# Patient Record
(demographics unavailable — no encounter records)

---

## 2024-10-22 NOTE — RISK ASSESSMENT
[Has a racial, or ethnic risk of G6PD deficiency (, , Mediterranean, or  ancestry)] : Has a racial, or ethnic risk of G6PD deficiency (, , Mediterranean, or  ancestry)  [Requires G6PD quantitative test] : Requires G6PD quantitative test [Presents with hemolytic anemia] : Does not present with hemolytic anemia  [Presents with hemolytic jaundice] : Does not present with hemolytic jaundice  [Presents with early onset increasing  jaundice persisting beyond the first week of life (bilirubin level greater than the 40th percentile] : Does not present with early onset increasing  jaundice persisting beyond the first week of life (bilirubin level greater than the 40th percentile for age in hours)   [Is admitted to the hospital for jaundice following discharge] : Is not admitted to the hospital for jaundice following discharge   [Has family history of G6PD deficiency (Symptoms include anemia and jaundice following illness, ingestion of bandar beans or bitter melon,] : Does not have family history of G6PD deficiency (Symptoms include anemia and jaundice following illness, ingestion of bandar beans or bitter melon, exposure to tash compounds or mothballs, or after taking certain medications (including but not limited to sulfa-containing drugs, primaquine, dapsone, fluoroquinolones, nitrofurantoin, pyridium, sulfonylureas, etc.)

## 2024-10-22 NOTE — DISCUSSION/SUMMARY
[Normal Growth] : growth [Normal Development] : developmental [No Elimination Concerns] : elimination [Continue Regimen] : feeding [No Skin Concerns] : skin [Normal Sleep Pattern] : sleep [Term Infant] : term infant [None] : no known medical problems [Add Food/Vitamin] : add ~M [Vitamin D] : vitamin D [Anticipatory Guidance Given] : Anticipatory guidance addressed as per the history of present illness section [No Vaccines] : no vaccines needed [No Medications] : ~He/She~ is not on any medications [Parent/Guardian] : Parent/Guardian [FreeTextEntry1] : Ex FT 4 Day old F here for NB Visit Declined Hep B and Beyfortus [place] score 0 Vitamin D sent RTC in one week for weight check  HM Recommend exclusive breastfeeding, 8-12 feedings per day. Mother should continue prenatal vitamins and avoid alcohol. If formula is needed, recommend iron-fortified formulations, 2-4 oz every 2-3 hrs. Wash hands before touching your baby. When in car, patient should be in rear-facing car seat in back seat. Put baby to sleep on back, in own crib with no loose or soft bedding. Bathe with a washcloth until cord falls off and if a boy until circumcision heals. Support the 's head and hold the baby close. Help baby to develop sleep and feeding routines. Limit baby's exposure to others, especially those with fever or unknown vaccine status. Parents counseled to call if rectal temperature >100.4 degrees F.

## 2024-10-22 NOTE — DISCUSSION/SUMMARY
[Normal Growth] : growth [Normal Development] : developmental [No Elimination Concerns] : elimination [Continue Regimen] : feeding [No Skin Concerns] : skin [Normal Sleep Pattern] : sleep [Term Infant] : term infant [None] : no known medical problems [Add Food/Vitamin] : add ~M [Vitamin D] : vitamin D [Anticipatory Guidance Given] : Anticipatory guidance addressed as per the history of present illness section [No Vaccines] : no vaccines needed [No Medications] : ~He/She~ is not on any medications [Parent/Guardian] : Parent/Guardian [FreeTextEntry1] : Ex FT 4 Day old F here for NB Visit Declined Hep B and Beyfortus [place] score 0 Vitamin D sent RTC in one week for weight check  HM Recommend exclusive breastfeeding, 8-12 feedings per day. Mother should continue prenatal vitamins and avoid alcohol. If formula is needed, recommend iron-fortified formulations, 2-4 oz every 2-3 hrs. Wash hands before touching your baby. When in car, patient should be in rear-facing car seat in back seat. Put baby to sleep on back, in own crib with no loose or soft bedding. Bathe with a washcloth until cord falls off and if a boy until circumcision heals. Support the 's head and hold the baby close. Help baby to develop sleep and feeding routines. Limit baby's exposure to others, especially those with fever or unknown vaccine status. Parents counseled to call if rectal temperature >100.4 degrees F.

## 2024-10-22 NOTE — PHYSICAL EXAM
[Alert] : alert [Normocephalic] : normocephalic [Flat Open Anterior [place]] : flat open anterior fontanelle [PERRL] : PERRL [Red Reflex Bilateral] : red reflex bilateral [Normally Placed Ears] : normally placed ears [Auricles Well Formed] : auricles well formed [Clear Tympanic membranes] : clear tympanic membranes [Light reflex present] : light reflex present [Bony structures visible] : bony structures visible [Patent Auditory Canal] : patent auditory canal [Nares Patent] : nares patent [Palate Intact] : palate intact [Uvula Midline] : uvula midline [Supple, full passive range of motion] : supple, full passive range of motion [Symmetric Chest Rise] : symmetric chest rise [Clear to Auscultation Bilaterally] : clear to auscultation bilaterally [Regular Rate and Rhythm] : regular rate and rhythm [S1, S2 present] : S1, S2 present [+2 Femoral Pulses] : +2 femoral pulses [Soft] : soft [Bowel Sounds] : bowel sounds present [Umbilical Stump Dry, Clean, Intact] : umbilical stump dry, clean, intact [Normal external genitalia] : normal external genitalia [Patent Vagina] : patent vagina [Patent] : patent [Normally Placed] : normally placed [No Abnormal Lymph Nodes Palpated] : no abnormal lymph nodes palpated [Symmetric Flexed Extremities] : symmetric flexed extremities [Startle Reflex] : startle reflex present [Suck Reflex] : suck reflex present [Rooting] : rooting reflex present [Palmar Grasp] : palmar grasp present [Plantar Grasp] : plantar reflex present [Symmetric Roya] : symmetric [place] [Acute Distress] : no acute distress [Icteric sclera] : nonicteric sclera [Discharge] : no discharge [Palpable Masses] : no palpable masses [Murmurs] : no murmurs [Tender] : nontender [Distended] : not distended [Hepatomegaly] : no hepatomegaly [Splenomegaly] : no splenomegaly [Clitoromegaly] : no clitoromegaly [Campuzano-Ortolani] : negative Campuzano-Ortolani [Spinal Dimple] : no spinal dimple [Tuft of Hair] : no tuft of hair [Jaundice] : not jaundice

## 2024-10-22 NOTE — HISTORY OF PRESENT ILLNESS
[TcB: _____] : Transcutaneous Bilirubin [unfilled] mg/dL [Born at ___ Wks Gestation] : The patient was born at [unfilled] weeks gestation [] : via normal spontaneous vaginal delivery [[facility]] : at [facility] [(1) _____] : [unfilled] [(5) _____] : [unfilled] [BW: _____] : weight of [unfilled] [Length: _____] : length of [unfilled] [HC: _____] : head circumference of [unfilled] [Time of Birth: _____] : Time of birth was [unfilled] [Age: ___] : [unfilled] year old mother [G: ___] : G [unfilled] [P: ___] : P [unfilled] [Rubella (Immune)] : Rubella immune [MBT: ____] : MBT - [unfilled] [None] : There are no risk factors [Formula ___ oz/feed] : [unfilled] oz of formula per feed [Frequency of stools: ___] : Frequency of stools: [unfilled]  stools [Green/brown] : green/brown [In Bassinet/Crib] : sleeps in bassinet/crib [On back] : sleeps on back [Water heater temperature set at <120 degrees F] : Water heater temperature set at <120 degrees F [Rear facing car seat in back seat] : Rear facing car seat in back seat [Carbon Monoxide Detectors] : Carbon monoxide detectors at home [Smoke Detectors] : Smoke detectors at home. [NO] : No [Hours between feeds ___] : Child is fed every [unfilled] hours [No] : No cigarette smoke exposure [HepBsAG] : HepBsAg negative [HIV] : HIV negative [HepC] : Hepatitis C negative [GBS] : GBS negative [VDRL/RPR (Reactive)] : VDRL/RPR nonreactive [de-identified] : 24 [FreeTextEntry8] : PediatricianCritical Congenital Heart Defect (CCHD) ScreenCCHD Screen [10-14]: InitialPre-Ductal SpO2(%): 99Post-Ductal SpO2(%): 377GjT4 Difference(Pre MINUS Post): -1Extremities Used: Right Hand, Right FootResult: PassedFollow up: Normal Screen- (No follow-up needed) ScreenScreen#: [id number] Date: 14-Oct-2024Screen Comment: N/AScreen#: [id number] Date: 14-Oct-2024Screen Comment: cchd passed. right hand 99%. right foot 100Hearing Screen - FinalRight ear hearing screen completed date: 14-Oct-2024Right ear screen method: EOAE (evoked otoacoustic emission)Right ear screen result: PassedRight ear screen comment: N/ALeft ear hearing screen completed date: 14-Oct-2024Left ear screen method: EOAE (evoked otoacoustic emission)Left ear screen result: PassedLeft ear screen comments: N/ATranscutaneous BilirubinSite: Sternum (14 Oct 710956:24)Bilirubin: 7.2 (14 Oct 893823:)Bilirubin: 5.9 (14 Oct 503322:43)Site: Sternum (14 Oct 189949:43) [Co-sleeping] : no co-sleeping [Loose bedding, pillow, toys, and/or bumpers in crib] : no loose bedding, pillow, toys, and/or bumpers in crib [Pacifier] : Not using pacifier [Exposure to electronic nicotine delivery system] : No exposure to electronic nicotine delivery system [Hepatitis B Vaccine Given] : Hepatitis B vaccine not given [de-identified] : none [FreeTextEntry1] : Discharge Information for your PediatricianHospital General InformationDate/Time of Birth: 10/13/2024 05:39:00Hospital Narrative: Baby is a 37.4wk AGA female born to a 28y/o  mother via . PEDS called to delivery for chorio and Mg. Maternal history uncomplicated. Pregnancy c/b sPEC (mg), chorio. Maternal blood type A+. PNL HIV negative, HepB negative, RPR non-reactive, and Rubella immune. GBS negative on 10/2. AROM at 0600 on 10/12 clear fluids. Terminal mec. Delivery uncomplicated. Delay cord clamp x1 min. Baby born vigorous and crying spontaneously. Warmed, dried, suctioned and stimulated. Apgars 9/9. EOS 0.47. Mom temp 38.1, got zosyn >4hr before. Mom plans to breastfeed and consents hepB.BW: 2745DOB: 10/13TOB: 0539Since admission to the NBN, baby has been feeding well, stooling and making wet diapers. Vitals have remained stable. Baby received routine NBN care. The baby lost an acceptable amount of weight during the nursery stay, down 1.82% from birth weight. Bilirubin was 7.2 at 24 hours of life.Parents have received routine  care education. The baby had all of the appropriate  screens before discharge and was noted to have normal feeding/voiding/stooling patterns at the time of discharge. The parents are aware to follow up with their outpatient pediatrician within 24-48 hrs and to closely monitor infant at home for any worrisome signs including, but not limited to, poor feeding, excess weight loss, dehydration, respiratory distress, fever, increasing jaundice or any other concern. Parents request this early discharge and agree to contact the baby's healthcare provider for any of the above.See below for CCHD, auditory screening, and Hepatitis B vaccine status.Hospital Diagnosis / GoalsDiagnosis: Single liveborn infant, delivered vaginally Assessment and Plan of Treatment: PImmunizationsVaccine Information: No Vaccines Administered.10/15/2024 2:30:19 PMRequested By: Fernanda Hayes ID: [id number] from: ABDELRAHMAN GRIDER [address] StreetOne Copy for the Patient  / One copy for the CHARTPage: 3 of 6 APURVA HANNAIFERDate of Birth: 10/13/2024MRN/VisitID: [id number] Psychiatric [facility] CtrCONFIDENTIALCONFIDENTIALPATIENT DISCHARGE INSTRUCTIONSDischarge Information for your Patient Portal InstructionsYou can access the [email or web address] Patient Portal offered by [employer] by registering at the following website: [email or web address]. By joining [employer]'s [email or web address] portal, you will also be able to view your health information using other applications (apps) compatible with our system

## 2024-10-22 NOTE — HISTORY OF PRESENT ILLNESS
[TcB: _____] : Transcutaneous Bilirubin [unfilled] mg/dL [Born at ___ Wks Gestation] : The patient was born at [unfilled] weeks gestation [] : via normal spontaneous vaginal delivery [[facility]] : at [facility] [(1) _____] : [unfilled] [(5) _____] : [unfilled] [BW: _____] : weight of [unfilled] [Length: _____] : length of [unfilled] [HC: _____] : head circumference of [unfilled] [Time of Birth: _____] : Time of birth was [unfilled] [Age: ___] : [unfilled] year old mother [G: ___] : G [unfilled] [P: ___] : P [unfilled] [Rubella (Immune)] : Rubella immune [MBT: ____] : MBT - [unfilled] [None] : There are no risk factors [Formula ___ oz/feed] : [unfilled] oz of formula per feed [Frequency of stools: ___] : Frequency of stools: [unfilled]  stools [Green/brown] : green/brown [In Bassinet/Crib] : sleeps in bassinet/crib [On back] : sleeps on back [Water heater temperature set at <120 degrees F] : Water heater temperature set at <120 degrees F [Rear facing car seat in back seat] : Rear facing car seat in back seat [Carbon Monoxide Detectors] : Carbon monoxide detectors at home [Smoke Detectors] : Smoke detectors at home. [NO] : No [Hours between feeds ___] : Child is fed every [unfilled] hours [No] : No cigarette smoke exposure [HepBsAG] : HepBsAg negative [HIV] : HIV negative [HepC] : Hepatitis C negative [GBS] : GBS negative [VDRL/RPR (Reactive)] : VDRL/RPR nonreactive [de-identified] : 24 [FreeTextEntry8] : PediatricianCritical Congenital Heart Defect (CCHD) ScreenCCHD Screen [10-14]: InitialPre-Ductal SpO2(%): 99Post-Ductal SpO2(%): 844WiF3 Difference(Pre MINUS Post): -1Extremities Used: Right Hand, Right FootResult: PassedFollow up: Normal Screen- (No follow-up needed) ScreenScreen#: [id number] Date: 14-Oct-2024Screen Comment: N/AScreen#: [id number] Date: 14-Oct-2024Screen Comment: cchd passed. right hand 99%. right foot 100Hearing Screen - FinalRight ear hearing screen completed date: 14-Oct-2024Right ear screen method: EOAE (evoked otoacoustic emission)Right ear screen result: PassedRight ear screen comment: N/ALeft ear hearing screen completed date: 14-Oct-2024Left ear screen method: EOAE (evoked otoacoustic emission)Left ear screen result: PassedLeft ear screen comments: N/ATranscutaneous BilirubinSite: Sternum (14 Oct 454491:24)Bilirubin: 7.2 (14 Oct 810985:)Bilirubin: 5.9 (14 Oct 631416:43)Site: Sternum (14 Oct 935359:43) [Co-sleeping] : no co-sleeping [Loose bedding, pillow, toys, and/or bumpers in crib] : no loose bedding, pillow, toys, and/or bumpers in crib [Pacifier] : Not using pacifier [Exposure to electronic nicotine delivery system] : No exposure to electronic nicotine delivery system [Hepatitis B Vaccine Given] : Hepatitis B vaccine not given [de-identified] : none [FreeTextEntry1] : Discharge Information for your PediatricianHospital General InformationDate/Time of Birth: 10/13/2024 05:39:00Hospital Narrative: Baby is a 37.4wk AGA female born to a 26y/o  mother via . PEDS called to delivery for chorio and Mg. Maternal history uncomplicated. Pregnancy c/b sPEC (mg), chorio. Maternal blood type A+. PNL HIV negative, HepB negative, RPR non-reactive, and Rubella immune. GBS negative on 10/2. AROM at 0600 on 10/12 clear fluids. Terminal mec. Delivery uncomplicated. Delay cord clamp x1 min. Baby born vigorous and crying spontaneously. Warmed, dried, suctioned and stimulated. Apgars 9/9. EOS 0.47. Mom temp 38.1, got zosyn >4hr before. Mom plans to breastfeed and consents hepB.BW: 2745DOB: 10/13TOB: 0539Since admission to the NBN, baby has been feeding well, stooling and making wet diapers. Vitals have remained stable. Baby received routine NBN care. The baby lost an acceptable amount of weight during the nursery stay, down 1.82% from birth weight. Bilirubin was 7.2 at 24 hours of life.Parents have received routine  care education. The baby had all of the appropriate  screens before discharge and was noted to have normal feeding/voiding/stooling patterns at the time of discharge. The parents are aware to follow up with their outpatient pediatrician within 24-48 hrs and to closely monitor infant at home for any worrisome signs including, but not limited to, poor feeding, excess weight loss, dehydration, respiratory distress, fever, increasing jaundice or any other concern. Parents request this early discharge and agree to contact the baby's healthcare provider for any of the above.See below for CCHD, auditory screening, and Hepatitis B vaccine status.Hospital Diagnosis / GoalsDiagnosis: Single liveborn infant, delivered vaginally Assessment and Plan of Treatment: PImmunizationsVaccine Information: No Vaccines Administered.10/15/2024 2:30:19 PMRequested By: Fernanda Hayes ID: [id number] from: ABDELRAHMAN GRIDER [address] StreetOne Copy for the Patient  / One copy for the CHARTPage: 3 of 6 APURVA HANNAIFERDate of Birth: 10/13/2024MRN/VisitID: [id number]. Lawrence Health System CtrCONFIDENTIALCONFIDENTIALPATIENT DISCHARGE INSTRUCTIONSDischarge Information for your Patient Portal InstructionsYou can access the [employer] Patient Portal offered by [employer] by registering at the following website: [email or web address]. By joining [employer]'s [employer] portal, you will also be able to view your health information using other applications (apps) compatible with our system

## 2024-10-22 NOTE — PHYSICAL EXAM
[Alert] : alert [Normocephalic] : normocephalic [Flat Open Anterior [place]] : flat open anterior fontanelle [PERRL] : PERRL [Red Reflex Bilateral] : red reflex bilateral [Normally Placed Ears] : normally placed ears [Auricles Well Formed] : auricles well formed [Clear Tympanic membranes] : clear tympanic membranes [Light reflex present] : light reflex present [Bony structures visible] : bony structures visible [Patent Auditory Canal] : patent auditory canal [Nares Patent] : nares patent [Palate Intact] : palate intact [Uvula Midline] : uvula midline [Supple, full passive range of motion] : supple, full passive range of motion [Symmetric Chest Rise] : symmetric chest rise [Clear to Auscultation Bilaterally] : clear to auscultation bilaterally [Regular Rate and Rhythm] : regular rate and rhythm [S1, S2 present] : S1, S2 present [+2 Femoral Pulses] : +2 femoral pulses [Soft] : soft [Bowel Sounds] : bowel sounds present [Umbilical Stump Dry, Clean, Intact] : umbilical stump dry, clean, intact [Normal external genitalia] : normal external genitalia [Patent Vagina] : patent vagina [Patent] : patent [Normally Placed] : normally placed [No Abnormal Lymph Nodes Palpated] : no abnormal lymph nodes palpated [Symmetric Flexed Extremities] : symmetric flexed extremities [Startle Reflex] : startle reflex present [Suck Reflex] : suck reflex present [Rooting] : rooting reflex present [Palmar Grasp] : palmar grasp present [Plantar Grasp] : plantar reflex present [Symmetric Roya] : symmetric [place] [Acute Distress] : no acute distress [Icteric sclera] : nonicteric sclera [Discharge] : no discharge [Palpable Masses] : no palpable masses [Murmurs] : no murmurs [Tender] : nontender [Distended] : not distended [Hepatomegaly] : no hepatomegaly [Splenomegaly] : no splenomegaly [Clitoromegaly] : no clitoromegaly [Campuzano-Ortolani] : negative Campuzano-Ortolani [Spinal Dimple] : no spinal dimple [Tuft of Hair] : no tuft of hair [Jaundice] : not jaundice

## 2024-10-24 NOTE — HISTORY OF PRESENT ILLNESS
[FreeTextEntry6] : weight check  regained BW no concerns feeding breast milk 2 ounces every 2-3 hours

## 2024-10-24 NOTE — DISCUSSION/SUMMARY
[FreeTextEntry1] : 11D old here for Wt check regained BW, gained 10g per day since last visit Educated to feed on demand RTC in 2 weeks/PRN for wt check and early 1 month visit  HM Recommend exclusive breastfeeding, 8-12 feedings per day. Mother should continue prenatal vitamins and avoid alcohol. If formula is needed, recommend iron-fortified formulations, 2-4 oz every 2-3 hrs. Wash hands before touching your baby. When in car, patient should be in rear-facing car seat in back seat. Put baby to sleep on back, in own crib with no loose or soft bedding. Bathe with a washcloth until cord falls off and if a boy until circumcision heals. Support the 's head and hold the baby close. Help baby to develop sleep and feeding routines. Limit baby's exposure to others, especially those with fever or unknown vaccine status. Parents counseled to call if rectal temperature >100.4 degrees F.

## 2024-11-09 NOTE — DISCUSSION/SUMMARY
[FreeTextEntry1] : 27 day old F here for nasal congestion and constipation.  Nasal congestion - Discussed bulb suctioning and nasal saline drops - Reviewed s/s of resp distress  Constipation/Gas/Spitting up - guaiac neg. - Reflux precautions - Tummy time  RTC if sx persist or worsen.
97.7

## 2024-11-09 NOTE — HISTORY OF PRESENT ILLNESS
[FreeTextEntry6] : Started with Sim 360 initially then about 1 week ago switched to Enfamil (from [facility]) and then was gassy, not sleeping well and fussy. Then switched to Sim Sensitive a few days ago. 2 days ago seemed more congested and not sleeping as well.  Feeding 2oz every 2-3 hours.  Wet diapers 4-5/day. BMs - last BM was last night; prior to that last BM was 11/7.  No fevers.   Spitting up with some feeds.

## 2024-11-15 NOTE — HISTORY OF PRESENT ILLNESS
[Mother] : mother [Father] : father [Breast milk] : breast milk [Expressed Breast milk ___oz/feed] : [unfilled] oz of expressed breast milk per feed [Formula ___ oz/feed] : [unfilled] oz of formula per feed [___ voids per day] : [unfilled] voids per day [Frequency of stools: ___] : Frequency of stools: [unfilled]  stools [per day] : per day. [Yellow] : yellow [Seedy] : seedy [In Bassinet/Crib] : sleeps in bassinet/crib [On back] : sleeps on back [Co-sleeping] : co-sleeping [Pacifier use] : Pacifier use [No] : No cigarette smoke exposure [Rear facing car seat in back seat] : Rear facing car seat in back seat [Carbon Monoxide Detectors] : Carbon monoxide detectors at home [Smoke Detectors] : Smoke detectors at home. [FreeTextEntry6] : Current concerns: fussy, sleeps only 3 hours at night. Rash started 1 week, started on ear, spread to neck and now her back.  [Loose bedding, pillow, toys, and/or bumpers in crib] : no loose bedding, pillow, toys, and/or bumpers in crib [Exposure to electronic nicotine delivery system] : No exposure to electronic nicotine delivery system [FreeTextEntry7] : No ED or UC visits.  [de-identified] : Breastfeed 20 min per breast.  [FreeTextEntry3] : Co-sleeping at times with baby.  [de-identified] : Due for hepatitis B vaccine. Mother endorses she received RSV vaccine while pregnant.  [FreeTextEntry1] :  Current concerns: fussy, sleeps only 3 hours at night. Rash started 1 week, started on ear, spread to neck and now her back.

## 2024-11-15 NOTE — PHYSICAL EXAM
[Alert] : alert [Normocephalic] : normocephalic [Flat Open Anterior [place]] : flat open anterior fontanelle [Conjunctivae with no discharge] : conjunctivae with no discharge [PERRL] : PERRL [Red Reflex Bilateral] : red reflex bilateral [Normally Placed Ears] : normally placed ears [Auricles Well Formed] : auricles well formed [Palate Intact] : palate intact [Clear to Auscultation Bilaterally] : clear to auscultation bilaterally [Symmetric Chest Rise] : symmetric chest rise [Regular Rate and Rhythm] : regular rate and rhythm [S1, S2 present] : S1, S2 present [Soft] : soft [Normal external genitailia] : normal external genitalia [Normally Placed] : normally placed [Symmetric Flexed Extremities] : symmetric flexed extremities [Suck Reflex] : suck reflex present [Palmar Grasp] : palmar grasp reflex present [Plantar Grasp] : plantar grasp reflex present [Symmetric Roya] : symmetric [place] [Rash and/or lesion present] : rash and/or lesion present [Acute Distress] : no acute distress [Discharge] : no discharge [Nares Patent] : nares not patent [Tender] : nontender [Distended] : not distended [Campuzano-Ortolani] : negative Campuzano-Ortolani [Jaundice] : no jaundice [de-identified] : erythematous papular rash on right ear, face and neck

## 2024-11-15 NOTE — HISTORY OF PRESENT ILLNESS
[Mother] : mother [Father] : father [Breast milk] : breast milk [Expressed Breast milk ___oz/feed] : [unfilled] oz of expressed breast milk per feed [Formula ___ oz/feed] : [unfilled] oz of formula per feed [___ voids per day] : [unfilled] voids per day [Frequency of stools: ___] : Frequency of stools: [unfilled]  stools [per day] : per day. [Yellow] : yellow [Seedy] : seedy [In Bassinet/Crib] : sleeps in bassinet/crib [On back] : sleeps on back [Co-sleeping] : co-sleeping [Pacifier use] : Pacifier use [No] : No cigarette smoke exposure [Rear facing car seat in back seat] : Rear facing car seat in back seat [Carbon Monoxide Detectors] : Carbon monoxide detectors at home [Smoke Detectors] : Smoke detectors at home. [FreeTextEntry6] : Current concerns: fussy, sleeps only 3 hours at night. Rash started 1 week, started on ear, spread to neck and now her back.  [Loose bedding, pillow, toys, and/or bumpers in crib] : no loose bedding, pillow, toys, and/or bumpers in crib [Exposure to electronic nicotine delivery system] : No exposure to electronic nicotine delivery system [FreeTextEntry7] : No ED or UC visits.  [de-identified] : Breastfeed 20 min per breast.  [FreeTextEntry3] : Co-sleeping at times with baby.  [de-identified] : Due for hepatitis B vaccine. Mother endorses she received RSV vaccine while pregnant.  [FreeTextEntry1] :  Current concerns: fussy, sleeps only 3 hours at night. Rash started 1 week, started on ear, spread to neck and now her back.

## 2024-11-15 NOTE — PHYSICAL EXAM
[Alert] : alert [Normocephalic] : normocephalic [Flat Open Anterior [place]] : flat open anterior fontanelle [Conjunctivae with no discharge] : conjunctivae with no discharge [PERRL] : PERRL [Red Reflex Bilateral] : red reflex bilateral [Normally Placed Ears] : normally placed ears [Auricles Well Formed] : auricles well formed [Palate Intact] : palate intact [Clear to Auscultation Bilaterally] : clear to auscultation bilaterally [Symmetric Chest Rise] : symmetric chest rise [Regular Rate and Rhythm] : regular rate and rhythm [S1, S2 present] : S1, S2 present [Soft] : soft [Normal external genitailia] : normal external genitalia [Normally Placed] : normally placed [Symmetric Flexed Extremities] : symmetric flexed extremities [Suck Reflex] : suck reflex present [Palmar Grasp] : palmar grasp reflex present [Plantar Grasp] : plantar grasp reflex present [Symmetric Roya] : symmetric [place] [Rash and/or lesion present] : rash and/or lesion present [Acute Distress] : no acute distress [Discharge] : no discharge [Nares Patent] : nares not patent [Tender] : nontender [Distended] : not distended [Campuzano-Ortolani] : negative Campuzano-Ortolani [Jaundice] : no jaundice [de-identified] : erythematous papular rash on right ear, face and neck

## 2024-11-15 NOTE — DISCUSSION/SUMMARY
[] : The components of the vaccine(s) to be administered today are listed in the plan of care. The disease(s) for which the vaccine(s) are intended to prevent and the risks have been discussed with the caretaker.  The risks are also included in the appropriate vaccination information statements which have been provided to the patient's caregiver.  The caregiver has given consent to vaccinate. [Normal Growth] : growth [Normal Development] : development  [No Elimination Concerns] : elimination [Continue Regimen] : feeding [No Skin Concerns] : skin [Normal Sleep Pattern] : sleep [None] : no medical problems [Anticipatory Guidance Given] : Anticipatory guidance addressed as per the history of present illness section [Parental Well-Being] : parental well-being [Family Adjustment] : family adjustment [Feeding Routines] : feeding routines [Infant Adjustment] : infant adjustment [Safety] : safety [Age Approp Vaccines] : Age appropriate vaccines administered [No Medications] : ~He/She~ is not on any medications [Parent/Guardian] : Parent/Guardian [FreeTextEntry1] : This is a 1 mo here for a WCC.   Health Maintenance: At 4th percentile for weight. BF, pumping and feeding formula, with vitamin D supplementation. Counseled on fussiness, tummy time, and reflex precautions. No stooling or voiding concerns. Counseled against co-sleeping. Received Hepatitis B vaccine.  Rash: Appears to be miliaria rubra rash, counseled on keeping skin moisturized, will continue to monitor progress.   Next visit in 1 mo for 2 mo WCC.

## 2024-11-15 NOTE — PHYSICAL EXAM
[Alert] : alert [Normocephalic] : normocephalic [Flat Open Anterior [place]] : flat open anterior fontanelle [Conjunctivae with no discharge] : conjunctivae with no discharge [PERRL] : PERRL [Red Reflex Bilateral] : red reflex bilateral [Normally Placed Ears] : normally placed ears [Auricles Well Formed] : auricles well formed [Palate Intact] : palate intact [Clear to Auscultation Bilaterally] : clear to auscultation bilaterally [Symmetric Chest Rise] : symmetric chest rise [Regular Rate and Rhythm] : regular rate and rhythm [S1, S2 present] : S1, S2 present [Soft] : soft [Normal external genitailia] : normal external genitalia [Normally Placed] : normally placed [Symmetric Flexed Extremities] : symmetric flexed extremities [Suck Reflex] : suck reflex present [Palmar Grasp] : palmar grasp reflex present [Plantar Grasp] : plantar grasp reflex present [Symmetric Roya] : symmetric [place] [Rash and/or lesion present] : rash and/or lesion present [Acute Distress] : no acute distress [Discharge] : no discharge [Nares Patent] : nares not patent [Tender] : nontender [Distended] : not distended [Campuzano-Ortolani] : negative Campuzano-Ortolani [Jaundice] : no jaundice [de-identified] : erythematous papular rash on right ear, face and neck

## 2024-11-15 NOTE — REVIEW OF SYSTEMS
[Fussy] : fussy [Spitting Up] : spitting up [Rash] : rash [Irritable] : no irritability [Eye Discharge] : no eye discharge [Eye Redness] : no eye redness [Nasal Discharge] : no nasal discharge [Nasal Congestion] : no nasal congestion [Diaphoresis] : not diaphoretic [Edema] : no edema [Wheezing] : no wheezing [Cough] : no cough [Intolerance to feeds] : tolerance to feeds [Constipation] : no constipation [Vomiting] : no vomiting [Hypertonicity] : not hypertonic [Hypotonicity] : not hypotonic [Restriction of Motion] : no restriction of motion [Swelling of Joint] : no swelling of joint [Jaundice] : no jaundice [Dry Skin] : no dry skin [Easy Bruising] : no tendency for easy bruising [Bleeding Gums] : no bleeding gums [Hematuria] : no hematuria [Vaginal Bleeding] : no vaginal bleeding

## 2024-11-15 NOTE — HISTORY OF PRESENT ILLNESS
[Mother] : mother [Father] : father [Breast milk] : breast milk [Expressed Breast milk ___oz/feed] : [unfilled] oz of expressed breast milk per feed [Formula ___ oz/feed] : [unfilled] oz of formula per feed [___ voids per day] : [unfilled] voids per day [Frequency of stools: ___] : Frequency of stools: [unfilled]  stools [per day] : per day. [Yellow] : yellow [Seedy] : seedy [In Bassinet/Crib] : sleeps in bassinet/crib [On back] : sleeps on back [Co-sleeping] : co-sleeping [Pacifier use] : Pacifier use [No] : No cigarette smoke exposure [Rear facing car seat in back seat] : Rear facing car seat in back seat [Carbon Monoxide Detectors] : Carbon monoxide detectors at home [Smoke Detectors] : Smoke detectors at home. [FreeTextEntry6] : Current concerns: fussy, sleeps only 3 hours at night. Rash started 1 week, started on ear, spread to neck and now her back.  [Loose bedding, pillow, toys, and/or bumpers in crib] : no loose bedding, pillow, toys, and/or bumpers in crib [Exposure to electronic nicotine delivery system] : No exposure to electronic nicotine delivery system [FreeTextEntry7] : No ED or UC visits.  [de-identified] : Breastfeed 20 min per breast.  [FreeTextEntry3] : Co-sleeping at times with baby.  [de-identified] : Due for hepatitis B vaccine. Mother endorses she received RSV vaccine while pregnant.  [FreeTextEntry1] :  Current concerns: fussy, sleeps only 3 hours at night. Rash started 1 week, started on ear, spread to neck and now her back.

## 2024-11-15 NOTE — DEVELOPMENTAL MILESTONES
[Calms when picked up or spoken to] : calms when picked up or spoken to [Looks briefly at objects] : looks briefly at objects [Alerts to unexpected sound] : alerts to unexpected sound [Holds chin up in prone] : holds chin up in prone [Holds fingers more open at rest] : holds fingers more open at rest [Passed] : passed [FreeTextEntry2] : 0 [Makes brief short vowel sounds] : does not make brief short vowel sounds

## 2024-12-16 NOTE — PHYSICAL EXAM
[Alert] : alert [Normocephalic] : normocephalic [Flat Open Anterior [place]] : flat open anterior fontanelle [PERRL] : PERRL [Red Reflex Bilateral] : red reflex bilateral [Normally Placed Ears] : normally placed ears [Auricles Well Formed] : auricles well formed [Clear Tympanic membranes] : clear tympanic membranes [Light reflex present] : light reflex present [Bony landmarks visible] : bony landmarks visible [Nares Patent] : nares patent [Palate Intact] : palate intact [Uvula Midline] : uvula midline [Supple, full passive range of motion] : supple, full passive range of motion [Symmetric Chest Rise] : symmetric chest rise [Clear to Auscultation Bilaterally] : clear to auscultation bilaterally [Regular Rate and Rhythm] : regular rate and rhythm [S1, S2 present] : S1, S2 present [+2 Femoral Pulses] : +2 femoral pulses [Soft] : soft [Bowel Sounds] : bowel sounds present [Normal external genitailia] : normal external genitalia [Patent Vagina] : vagina patent [Normally Placed] : normally placed [No Abnormal Lymph Nodes Palpated] : no abnormal lymph nodes palpated [Symmetric Flexed Extremities] : symmetric flexed extremities [Startle Reflex] : startle reflex present [Suck Reflex] : suck reflex present [Rooting] : rooting reflex present [Palmar Grasp] : palmar grasp reflex present [Plantar Grasp] : plantar grasp reflex present [Symmetric Roya] : symmetric [place] [Acute Distress] : no acute distress [Discharge] : no discharge [Palpable Masses] : no palpable masses [Murmurs] : no murmurs [Tender] : nontender [Distended] : not distended [Hepatomegaly] : no hepatomegaly [Splenomegaly] : no splenomegaly [Clitoromegaly] : no clitoromegaly [Campuzano-Ortolani] : negative Campuzano-Ortolani [Spinal Dimple] : no spinal dimple [Tuft of Hair] : no tuft of hair [Rash and/or lesion present] : no rash/lesion [de-identified] : skin peeling/dryness on shoulders bilaterally

## 2024-12-16 NOTE — HISTORY OF PRESENT ILLNESS
[Normal] : Normal [In Bassinet/Crib] : sleeps in bassinet/crib [Co-sleeping] : co-sleeping [Pacifier use] : Pacifier use [No] : No cigarette smoke exposure [Carbon Monoxide Detectors] : Carbon monoxide detectors at home [Smoke Detectors] : Smoke detectors at home. [At risk for exposure to TB] : At risk for exposure to Tuberculosis  [NO] : No [___ voids per day] : [unfilled] voids per day [Rear facing car seat in back seat] : Rear facing car seat in back seat [On back] : does not sleep on back [Loose bedding, pillow, toys, and/or bumpers in crib] : no loose bedding, pillow, toys, and/or bumpers in crib [de-identified] : Enfamil 4oz every 3 hours. Noted spit-up after feeds, has improved with keeping patient upright for 20 minutes after feeds. No bilious or projectile emesis.  [FreeTextEntry8] : 1-2 stools daily, no bloody [FreeTextEntry3] : Patient will start in basinet, will wake to feed and then wake again 30 minutes later. Mom will bring patient in to bed to soothe.

## 2024-12-16 NOTE — HISTORY OF PRESENT ILLNESS
[Normal] : Normal [In Bassinet/Crib] : sleeps in bassinet/crib [Co-sleeping] : co-sleeping [Pacifier use] : Pacifier use [No] : No cigarette smoke exposure [Carbon Monoxide Detectors] : Carbon monoxide detectors at home [Smoke Detectors] : Smoke detectors at home. [At risk for exposure to TB] : At risk for exposure to Tuberculosis  [NO] : No [___ voids per day] : [unfilled] voids per day [Rear facing car seat in back seat] : Rear facing car seat in back seat [On back] : does not sleep on back [Loose bedding, pillow, toys, and/or bumpers in crib] : no loose bedding, pillow, toys, and/or bumpers in crib [de-identified] : Enfamil 4oz every 3 hours. Noted spit-up after feeds, has improved with keeping patient upright for 20 minutes after feeds. No bilious or projectile emesis.  [FreeTextEntry8] : 1-2 stools daily, no bloody [FreeTextEntry3] : Patient will start in basinet, will wake to feed and then wake again 30 minutes later. Mom will bring patient in to bed to soothe.

## 2024-12-16 NOTE — DISCUSSION/SUMMARY
[Parental (Maternal) Well-Being] : parental (maternal) well-being [Infant-Family Synchrony] : infant-family synchrony [Nutritional Adequacy] : nutritional adequacy [Infant Behavior] : infant behavior [Safety] : safety [] : The components of the vaccine(s) to be administered today are listed in the plan of care. The disease(s) for which the vaccine(s) are intended to prevent and the risks have been discussed with the caretaker.  The risks are also included in the appropriate vaccination information statements which have been provided to the patient's caregiver.  The caregiver has given consent to vaccinate. [FreeTextEntry1] : Patient is a healthy ex FT female presenting for 2mo WCC. No concerns with growth or development today. Patient with spit-up, has continued to gain weight, No bilious or projectile emesis. Normal stools. Has improved with holding patient upright for 20 minutes after feeds, also recommend frequent burping. Can also try smaller more frequent feeds. Does not need vitamin D as baby is exclusively formula feeding. Also, with some dry/peeling skin on shoulder bilaterally, patient does not scratch at rash. Can use Aquaphor or other fragrance-free lotion, will continue to monitor. Due for routine 2mo vaccines, mom has RSV vaccine within 2 months prior to birth. Plan for return in 2 months for 4mo WCC or sooner if needed.   Reinforced safe sleep education with mom, baby should not be co-sleeping at any time. Discussed strategies for safe sleep. Mom expressed understanding.    # HCM -routine 2mo vaccines today -d/c vitamin D drops  -return for 4mo WCC or sooner if needed  #safe sleep  -reinforced safe sleep strategies   #spit-up -reflux precautions discussed, continue to monitor

## 2024-12-16 NOTE — PHYSICAL EXAM
[Alert] : alert [Normocephalic] : normocephalic [Flat Open Anterior [place]] : flat open anterior fontanelle [PERRL] : PERRL [Red Reflex Bilateral] : red reflex bilateral [Normally Placed Ears] : normally placed ears [Auricles Well Formed] : auricles well formed [Clear Tympanic membranes] : clear tympanic membranes [Light reflex present] : light reflex present [Bony landmarks visible] : bony landmarks visible [Nares Patent] : nares patent [Palate Intact] : palate intact [Uvula Midline] : uvula midline [Supple, full passive range of motion] : supple, full passive range of motion [Symmetric Chest Rise] : symmetric chest rise [Clear to Auscultation Bilaterally] : clear to auscultation bilaterally [Regular Rate and Rhythm] : regular rate and rhythm [S1, S2 present] : S1, S2 present [+2 Femoral Pulses] : +2 femoral pulses [Soft] : soft [Bowel Sounds] : bowel sounds present [Normal external genitailia] : normal external genitalia [Patent Vagina] : vagina patent [Normally Placed] : normally placed [No Abnormal Lymph Nodes Palpated] : no abnormal lymph nodes palpated [Symmetric Flexed Extremities] : symmetric flexed extremities [Startle Reflex] : startle reflex present [Suck Reflex] : suck reflex present [Rooting] : rooting reflex present [Palmar Grasp] : palmar grasp reflex present [Plantar Grasp] : plantar grasp reflex present [Symmetric Roya] : symmetric [place] [Acute Distress] : no acute distress [Discharge] : no discharge [Palpable Masses] : no palpable masses [Murmurs] : no murmurs [Tender] : nontender [Distended] : not distended [Hepatomegaly] : no hepatomegaly [Splenomegaly] : no splenomegaly [Clitoromegaly] : no clitoromegaly [Campuzano-Ortolani] : negative Campuzano-Ortolani [Spinal Dimple] : no spinal dimple [Tuft of Hair] : no tuft of hair [Rash and/or lesion present] : no rash/lesion [de-identified] : skin peeling/dryness on shoulders bilaterally

## 2025-02-14 NOTE — DEVELOPMENTAL MILESTONES
[Laughs aloud] : laughs aloud [Vocalizes with extending cooing] : vocalizes with extending cooing [Supports on elbows & wrists in prone] : supports on elbows and wrists in prone [Plays with fingers in midline] : plays with fingers in midline [Grasps objects] : grasps objects [Keeps hands unfisted] : keeps hands unfisted [Passed] : passed [Normal Development] : Normal Development [Turns to voice] : turns to voice [Rolls over prone to supine] : does not roll over prone to supine [FreeTextEntry2] : 3

## 2025-02-14 NOTE — DISCUSSION/SUMMARY
[FreeTextEntry1] : Patient is a healthy 4mo female presenting for WCC. No concerns with growth or development. Discussed strategies for minimizing spit-up. Can either try slower pacing for feeds, stopping at 4oz and assessing further hunger cues to continue feeding. Continue holding upright and burping for 15-20 minutes after feeds. Can also try smaller, more frequent feeding. Discussed reflex medications typically do not stop spit-up, supportive strategies and time will help. Due for routine vaccines today. Return to clinic in 2 months for 6mo WCC or sooner if needed.   #HCM -routine 4mo vaccines today: [id number], PCV20, and rotavirus -mom has RSV vaccine during pregnancy, does not need Beyfortus  -return to clinic for 6mo WCC or sooner if needed.   #Reflux -discussed pacing feeds, smaller frequent feeds, and holding patient upright  -can continue similac sensitive if preferred, signed WIC form -f/u at next visit.

## 2025-02-14 NOTE — HISTORY OF PRESENT ILLNESS
[Normal] : Normal [Pacifier use] : Pacifier use [Tummy time] : tummy time [Screen time only for video chatting] : screen time only for video chatting [No] : No cigarette smoke exposure [Carbon Monoxide Detectors] : Carbon monoxide detectors at home [Smoke Detectors] : Smoke detectors at home. [NO] : No [In Bassinet/Crib] : sleeps in bassinet/crib [On back] : sleeps on back [Sleeps 12-16 hours per 24 hours (including naps)] : sleeps 12-16 hours per 24 hours (including naps) [Mother] : mother [Co-sleeping] : no co-sleeping [Loose bedding, pillow, toys, and/or bumpers in crib] : no loose bedding, pillow, toys, and/or bumpers in crib [Exposure to electronic nicotine delivery system] : No exposure to electronic nicotine delivery system [Rear facing car seat in back seat] : No rear facing car seat in back seat [de-identified] : feeding 5-6oz every 3 hours with persistent emesis despite burping. Have not been pacing feeds. Switched ti Enfamil AR briefly, patient did not tolerate. Switched back to Similac sensitive.  [FreeTextEntry9] : 15-20 x2

## 2025-02-14 NOTE — PHYSICAL EXAM
[Alert] : alert [Normocephalic] : normocephalic [Flat Open Anterior [place]] : flat open anterior fontanelle [Red Reflex] : red reflex bilateral [PERRL] : PERRL [Normally Placed Ears] : normally placed ears [Auricles Well Formed] : auricles well formed [Clear Tympanic membranes] : clear tympanic membranes [Light reflex present] : light reflex present [Bony landmarks visible] : bony landmarks visible [Nares Patent] : nares patent [Palate Intact] : palate intact [Uvula Midline] : uvula midline [Symmetric Chest Rise] : symmetric chest rise [Clear to Auscultation Bilaterally] : clear to auscultation bilaterally [Regular Rate and Rhythm] : regular rate and rhythm [S1, S2 present] : S1, S2 present [+2 Femoral Pulses] : (+) 2 femoral pulses [Soft] : soft [Bowel Sounds] : bowel sounds present [Normal External Genitalia] : normal external genitalia [Normal Vaginal Introitus] : normal vaginal introitus [Patent] : patent [Normally Placed] : normally placed [No Abnormal Lymph Nodes Palpated] : no abnormal lymph nodes palpated [Startle Reflex] : startle reflex present [Plantar Grasp] : plantar grasp reflex present [Symmetric Roya] : symmetric [place] [Acute Distress] : no acute distress [Discharge] : no discharge [Palpable Masses] : no palpable masses [Murmurs] : no murmurs [Tender] : nontender [Distended] : nondistended [Hepatomegaly] : no hepatomegaly [Splenomegaly] : no splenomegaly [Clitoromegaly] : no clitoromegaly [Campuzano-Ortolani] : negative Campuzano-Ortolani [Allis Sign] : negative Allis sign [Spinal Dimple] : no spinal dimple [Tuft of Hair] : no tuft of hair [Rash or Lesions] : no rash/lesions

## 2025-05-01 NOTE — DISCUSSION/SUMMARY
[Normal Growth] : growth [Normal Development] : development [None] : No medical problems [No Elimination Concerns] : elimination [No Feeding Concerns] : feeding [Normal Sleep Pattern] : sleep [Term Infant] : Term infant [Eczema] : eczema [Family Functioning] : family functioning [Nutrition and Feeding] : nutrition and feeding [Infant Development] : infant development [Oral Health] : oral health [Safety] : safety [No Medications] : ~He/She~ is not on any medications [Mother] : mother [Father] : father [] : The components of the vaccine(s) to be administered today are listed in the plan of care. The disease(s) for which the vaccine(s) are intended to prevent and the risks have been discussed with the caretaker.  The risks are also included in the appropriate vaccination information statements which have been provided to the patient's caregiver.  The caregiver has given consent to vaccinate. [FreeTextEntry1] : 6-month-old F with PMH reflux presenting for 6-month WCC. Growing, feeding, voiding/stooling, sleeping, developing normally. Discussed skin care for dry skin. Parents concerned about foul odor from umbilicus. No signs of infection. No drainage. Discussed keeping umbilicus dry in between baths. Physical exam normal. EDPS negative. Family wellness screen normal.  Recommend breastfeeding, 8-12 feedings per day. If formula is needed, 2-4 oz every 3-4 hrs. Introduce single-ingredient foods rich in iron, one at a time. Incorporate up to 4 oz of fluorinated water daily in a sippy cup. When teeth erupt wipe daily with washcloth. When in car, patient should be in rear-facing car seat in back seat. Put baby to sleep on back, in own crib with no loose or soft bedding. Lower crib mattress. Help baby to maintain sleep and feeding routines. May offer pacifier if needed. Continue tummy time when awake. Ensure home is safe since baby is now more mobile. Do not use infant walker. Read aloud to baby.   1. Routine Health Maintenance - No growth/feeding/elimination/sleep/development concerns - Received: [id number], PCV20, rotavirus vaccines - RTC in 3 months for 9-month WCC or sooner with acute concern

## 2025-05-01 NOTE — DEVELOPMENTAL MILESTONES
[Normal Development] : Normal Development [None] : none [Pats or smiles at reflection] : pats or smiles at reflection [Begins to turn when name called] : begins to turn when name called [Babbles] : babbles [Rolls over prone to supine] : rolls over prone to supine [Sits briefly without support] : sits briefly without support [Reaches for object and transfers] : reaches for object and transfers [Rakes small object with 4 fingers] : rakes small object with 4 fingers [[place] small object on surface] : bangs small object on surface [Passed] : passed [FreeTextEntry2] : 0

## 2025-05-01 NOTE — PHYSICAL EXAM
[Alert] : alert [Normocephalic] : normocephalic [Flat Open Anterior [place]] : flat open anterior fontanelle [Red Reflex] : red reflex bilateral [PERRL] : PERRL [Normally Placed Ears] : normally placed ears [Auricles Well Formed] : auricles well formed [Clear Tympanic membranes] : clear tympanic membranes [Light reflex present] : light reflex present [Bony landmarks visible] : bony landmarks visible [Discharge] : discharge [Nares Patent] : nares patent [Palate Intact] : palate intact [Supple, full passive range of motion] : supple, full passive range of motion [Symmetric Chest Rise] : symmetric chest rise [Clear to Auscultation Bilaterally] : clear to auscultation bilaterally [Regular Rate and Rhythm] : regular rate and rhythm [S1, S2 present] : S1, S2 present [+2 Femoral Pulses] : (+) 2 femoral pulses [Soft] : soft [Normal External Genitalia] : normal external genitalia [Normal Vaginal Introitus] : normal vaginal introitus [Patent] : patent [Normally Placed] : normally placed [No Abnormal Lymph Nodes Palpated] : no abnormal lymph nodes palpated [Symmetric Buttocks Creases] : symmetric buttocks creases [Plantar Grasp] : plantar grasp reflex present [Cranial Nerves Grossly Intact] : cranial nerves grossly intact [Acute Distress] : no acute distress [Palpable Masses] : no palpable masses [Murmurs] : no murmurs [Tender] : nontender [Distended] : nondistended [Hepatomegaly] : no hepatomegaly [Splenomegaly] : no splenomegaly [Clitoromegaly] : no clitoromegaly [Campuzano-Ortolani] : negative Campuzano-Ortolani [Spinal Dimple] : no spinal dimple [Tuft of Hair] : no tuft of hair [Rash or Lesions] : no rash/lesions [de-identified] : Dry skin

## 2025-05-01 NOTE — PHYSICAL EXAM
[Alert] : alert [Normocephalic] : normocephalic [Flat Open Anterior [place]] : flat open anterior fontanelle [Red Reflex] : red reflex bilateral [PERRL] : PERRL [Normally Placed Ears] : normally placed ears [Auricles Well Formed] : auricles well formed [Clear Tympanic membranes] : clear tympanic membranes [Light reflex present] : light reflex present [Bony landmarks visible] : bony landmarks visible [Discharge] : discharge [Nares Patent] : nares patent [Palate Intact] : palate intact [Supple, full passive range of motion] : supple, full passive range of motion [Symmetric Chest Rise] : symmetric chest rise [Clear to Auscultation Bilaterally] : clear to auscultation bilaterally [Regular Rate and Rhythm] : regular rate and rhythm [S1, S2 present] : S1, S2 present [+2 Femoral Pulses] : (+) 2 femoral pulses [Soft] : soft [Normal External Genitalia] : normal external genitalia [Normal Vaginal Introitus] : normal vaginal introitus [Patent] : patent [Normally Placed] : normally placed [No Abnormal Lymph Nodes Palpated] : no abnormal lymph nodes palpated [Symmetric Buttocks Creases] : symmetric buttocks creases [Plantar Grasp] : plantar grasp reflex present [Cranial Nerves Grossly Intact] : cranial nerves grossly intact [Acute Distress] : no acute distress [Palpable Masses] : no palpable masses [Murmurs] : no murmurs [Tender] : nontender [Distended] : nondistended [Hepatomegaly] : no hepatomegaly [Splenomegaly] : no splenomegaly [Clitoromegaly] : no clitoromegaly [Campuzano-Ortolani] : negative Campuzano-Ortolani [Spinal Dimple] : no spinal dimple [Tuft of Hair] : no tuft of hair [Rash or Lesions] : no rash/lesions [de-identified] : Dry skin

## 2025-05-01 NOTE — HISTORY OF PRESENT ILLNESS
[Mother] : mother [Father] : father [Formula ___ oz/feed] : [unfilled] oz of formula per feed [Hours between feeds ___] : Child is fed every [unfilled] hours [Fruits] : fruits [Vegetables] : vegetables [Normal] : Normal [Frequency of stools: ___] : Frequency of stools: [unfilled]  stools [per day] : per day. [Green/brown] : green/brown [In Bassinet/Crib] : sleeps in bassinet/crib [On back] : sleeps on back [Sleeps 12-16 hours per 24 hours (including naps)] : sleeps 12-16 hours per 24 hours (including naps) [Pacifier use] : Pacifier use [Tummy time] : tummy time [No] : No cigarette smoke exposure [Water heater temperature set at <120 degrees F] : Water heater temperature set at <120 degrees F [Rear facing car seat in back seat] : Rear facing car seat in back seat [Carbon Monoxide Detectors] : Carbon monoxide detectors at home [Smoke Detectors] : Smoke detectors at home. [NO] : No [___ Feeding per 24 hrs] : a  total of [unfilled] feedings in 24 hours [Loose bedding, pillow, toys, and/or bumpers in crib] : no loose bedding, pillow, toys, and/or bumpers in crib [Screen time only for video chatting] : screen time not just for video chatting [Exposure to electronic nicotine delivery system] : No exposure to electronic nicotine delivery system [de-identified] : No ER/UC visits. Recently started solids.  [de-identified] : Recent congestion and sneezing. No fevers, cough, difficulty breathing. Mom noted foul odor at umbilicus. No drainage [de-identified] : Сергей leach

## 2025-05-01 NOTE — DISCUSSION/SUMMARY
[Normal Growth] : growth [Normal Development] : development [None] : No medical problems [No Elimination Concerns] : elimination [No Feeding Concerns] : feeding [Normal Sleep Pattern] : sleep [Term Infant] : Term infant [Eczema] : eczema [Family Functioning] : family functioning [Nutrition and Feeding] : nutrition and feeding [Infant Development] : infant development [Oral Health] : oral health [Safety] : safety [No Medications] : ~He/She~ is not on any medications [Mother] : mother [Father] : father [] : The components of the vaccine(s) to be administered today are listed in the plan of care. The disease(s) for which the vaccine(s) are intended to prevent and the risks have been discussed with the caretaker.  The risks are also included in the appropriate vaccination information statements which have been provided to the patient's caregiver.  The caregiver has given consent to vaccinate. [FreeTextEntry1] : 6-month-old F with PMH reflux presenting for 6-month WCC. Growing, feeding, voiding/stooling, sleeping, developing normally. Discussed skin care for dry skin. Parents concerned about foul odor from umbilicus. No signs of infection. No drainage. Discussed keeping umbilicus dry in between baths. Physical exam normal. EDPS negative. Family wellness screen normal.  Recommend breastfeeding, 8-12 feedings per day. If formula is needed, 2-4 oz every 3-4 hrs. Introduce single-ingredient foods rich in iron, one at a time. Incorporate up to 4 oz of fluorinated water daily in a sippy cup. When teeth erupt wipe daily with washcloth. When in car, patient should be in rear-facing car seat in back seat. Put baby to sleep on back, in own crib with no loose or soft bedding. Lower crib mattress. Help baby to maintain sleep and feeding routines. May offer pacifier if needed. Continue tummy time when awake. Ensure home is safe since baby is now more mobile. Do not use infant walker. Read aloud to baby.   1. Routine Health Maintenance - No growth/feeding/elimination/sleep/development concerns - Received: [id number], PCV20, rotavirus vaccines - RTC in 3 months for 9-month WCC or sooner with acute concern

## 2025-05-01 NOTE — DISCUSSION/SUMMARY
[Normal Growth] : growth [Normal Development] : development [None] : No medical problems [No Elimination Concerns] : elimination [No Feeding Concerns] : feeding [Normal Sleep Pattern] : sleep [Term Infant] : Term infant [Eczema] : eczema [Family Functioning] : family functioning [Nutrition and Feeding] : nutrition and feeding [Infant Development] : infant development [Oral Health] : oral health [Safety] : safety [No Medications] : ~He/She~ is not on any medications [Mother] : mother [Father] : father [] : The components of the vaccine(s) to be administered today are listed in the plan of care. The disease(s) for which the vaccine(s) are intended to prevent and the risks have been discussed with the caretaker.  The risks are also included in the appropriate vaccination information statements which have been provided to the patient's caregiver.  The caregiver has given consent to vaccinate. [FreeTextEntry1] : 6-month-old F with PMH reflux presenting for 6-month WCC. Growing, feeding, voiding/stooling, sleeping, developing normally. Discussed skin care for dry skin. Parents concerned about foul odor from umbilicus. No signs of infection. No drainage. Discussed keeping umbilicus dry in between baths. Physical exam normal. EDPS negative. Family wellness screen normal.  Recommend breastfeeding, 8-12 feedings per day. If formula is needed, 2-4 oz every 3-4 hrs. Introduce single-ingredient foods rich in iron, one at a time. Incorporate up to 4 oz of fluorinated water daily in a sippy cup. When teeth erupt wipe daily with washcloth. When in car, patient should be in rear-facing car seat in back seat. Put baby to sleep on back, in own crib with no loose or soft bedding. Lower crib mattress. Help baby to maintain sleep and feeding routines. May offer pacifier if needed. Continue tummy time when awake. Ensure home is safe since baby is now more mobile. Do not use infant walker. Read aloud to baby.   1. Routine Health Maintenance - No growth/feeding/elimination/sleep/development concerns - Received: [id number], PCV20, rotavirus vaccines - RTC in 3 months for 9-month WCC or sooner with acute concern

## 2025-05-01 NOTE — REVIEW OF SYSTEMS
[Dry Skin] : dry skin [Irritable] : no irritability [Inconsolable] : consolable [Crying] : no crying [Eye Discharge] : no eye discharge [Eye Redness] : no eye redness [Cyanosis] : no cyanosis [Diaphoresis] : not diaphoretic [Edema] : no edema [Tachypnea] : not tachypneic [Wheezing] : no wheezing [Cough] : no cough [Constipation] : no constipation [Vomiting] : no vomiting [Diarrhea] : no diarrhea [Abnormal Movements] :  no abnormal movements [Rash] : no rash [Hematuria] : no hematuria

## 2025-05-01 NOTE — DEVELOPMENTAL MILESTONES
[Normal Development] : Normal Development [None] : none [Pats or smiles at reflection] : pats or smiles at reflection [Begins to turn when name called] : begins to turn when name called [Babbles] : babbles [Rolls over prone to supine] : rolls over prone to supine [Sits briefly without support] : sits briefly without support [Reaches for object and transfers] : reaches for object and transfers [Rakes small object with 4 fingers] : rakes small object with 4 fingers [Rhe[place] small object on surface] : bangs small object on surface [Passed] : passed [FreeTextEntry2] : 0

## 2025-05-01 NOTE — PHYSICAL EXAM
[Alert] : alert [Normocephalic] : normocephalic [Flat Open Anterior [place]] : flat open anterior fontanelle [Red Reflex] : red reflex bilateral [PERRL] : PERRL [Normally Placed Ears] : normally placed ears [Auricles Well Formed] : auricles well formed [Clear Tympanic membranes] : clear tympanic membranes [Light reflex present] : light reflex present [Bony landmarks visible] : bony landmarks visible [Discharge] : discharge [Nares Patent] : nares patent [Palate Intact] : palate intact [Supple, full passive range of motion] : supple, full passive range of motion [Symmetric Chest Rise] : symmetric chest rise [Clear to Auscultation Bilaterally] : clear to auscultation bilaterally [Regular Rate and Rhythm] : regular rate and rhythm [S1, S2 present] : S1, S2 present [+2 Femoral Pulses] : (+) 2 femoral pulses [Soft] : soft [Normal External Genitalia] : normal external genitalia [Normal Vaginal Introitus] : normal vaginal introitus [Patent] : patent [Normally Placed] : normally placed [No Abnormal Lymph Nodes Palpated] : no abnormal lymph nodes palpated [Symmetric Buttocks Creases] : symmetric buttocks creases [Plantar Grasp] : plantar grasp reflex present [Cranial Nerves Grossly Intact] : cranial nerves grossly intact [Acute Distress] : no acute distress [Palpable Masses] : no palpable masses [Murmurs] : no murmurs [Tender] : nontender [Distended] : nondistended [Hepatomegaly] : no hepatomegaly [Splenomegaly] : no splenomegaly [Clitoromegaly] : no clitoromegaly [Campuzano-Ortolani] : negative Campuzano-Ortolani [Spinal Dimple] : no spinal dimple [Tuft of Hair] : no tuft of hair [Rash or Lesions] : no rash/lesions [de-identified] : Dry skin

## 2025-05-01 NOTE — DEVELOPMENTAL MILESTONES
[Normal Development] : Normal Development [None] : none [Pats or smiles at reflection] : pats or smiles at reflection [Begins to turn when name called] : begins to turn when name called [Babbles] : babbles [Rolls over prone to supine] : rolls over prone to supine [Sits briefly without support] : sits briefly without support [Reaches for object and transfers] : reaches for object and transfers [Rakes small object with 4 fingers] : rakes small object with 4 fingers [[place] small object on surface] : bangs small object on surface [Passed] : passed [FreeTextEntry2] : 0

## 2025-05-01 NOTE — HISTORY OF PRESENT ILLNESS
[Mother] : mother [Father] : father [Formula ___ oz/feed] : [unfilled] oz of formula per feed [Hours between feeds ___] : Child is fed every [unfilled] hours [Fruits] : fruits [Vegetables] : vegetables [Normal] : Normal [Frequency of stools: ___] : Frequency of stools: [unfilled]  stools [per day] : per day. [Green/brown] : green/brown [In Bassinet/Crib] : sleeps in bassinet/crib [On back] : sleeps on back [Sleeps 12-16 hours per 24 hours (including naps)] : sleeps 12-16 hours per 24 hours (including naps) [Pacifier use] : Pacifier use [Tummy time] : tummy time [No] : No cigarette smoke exposure [Water heater temperature set at <120 degrees F] : Water heater temperature set at <120 degrees F [Rear facing car seat in back seat] : Rear facing car seat in back seat [Carbon Monoxide Detectors] : Carbon monoxide detectors at home [Smoke Detectors] : Smoke detectors at home. [NO] : No [___ Feeding per 24 hrs] : a  total of [unfilled] feedings in 24 hours [Loose bedding, pillow, toys, and/or bumpers in crib] : no loose bedding, pillow, toys, and/or bumpers in crib [Screen time only for video chatting] : screen time not just for video chatting [Exposure to electronic nicotine delivery system] : No exposure to electronic nicotine delivery system [de-identified] : No ER/UC visits. Recently started solids.  [de-identified] : Recent congestion and sneezing. No fevers, cough, difficulty breathing. Mom noted foul odor at umbilicus. No drainage [de-identified] : Сергей leach

## 2025-05-01 NOTE — HISTORY OF PRESENT ILLNESS
[Mother] : mother [Father] : father [Formula ___ oz/feed] : [unfilled] oz of formula per feed [Hours between feeds ___] : Child is fed every [unfilled] hours [Fruits] : fruits [Vegetables] : vegetables [Normal] : Normal [Frequency of stools: ___] : Frequency of stools: [unfilled]  stools [per day] : per day. [Green/brown] : green/brown [In Bassinet/Crib] : sleeps in bassinet/crib [On back] : sleeps on back [Sleeps 12-16 hours per 24 hours (including naps)] : sleeps 12-16 hours per 24 hours (including naps) [Pacifier use] : Pacifier use [Tummy time] : tummy time [No] : No cigarette smoke exposure [Water heater temperature set at <120 degrees F] : Water heater temperature set at <120 degrees F [Rear facing car seat in back seat] : Rear facing car seat in back seat [Carbon Monoxide Detectors] : Carbon monoxide detectors at home [Smoke Detectors] : Smoke detectors at home. [NO] : No [___ Feeding per 24 hrs] : a  total of [unfilled] feedings in 24 hours [Loose bedding, pillow, toys, and/or bumpers in crib] : no loose bedding, pillow, toys, and/or bumpers in crib [Screen time only for video chatting] : screen time not just for video chatting [Exposure to electronic nicotine delivery system] : No exposure to electronic nicotine delivery system [de-identified] : No ER/UC visits. Recently started solids.  [de-identified] : Recent congestion and sneezing. No fevers, cough, difficulty breathing. Mom noted foul odor at umbilicus. No drainage [de-identified] : Сергей leach